# Patient Record
Sex: FEMALE | Race: BLACK OR AFRICAN AMERICAN | NOT HISPANIC OR LATINO | URBAN - METROPOLITAN AREA
[De-identification: names, ages, dates, MRNs, and addresses within clinical notes are randomized per-mention and may not be internally consistent; named-entity substitution may affect disease eponyms.]

---
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
## 2018-11-05 ENCOUNTER — EMERGENCY (EMERGENCY)
Facility: HOSPITAL | Age: 18
LOS: 1 days | End: 2018-11-05
Payer: SELF-PAY

## 2018-11-05 PROCEDURE — 99284 EMERGENCY DEPT VISIT MOD MDM: CPT

## 2018-11-05 PROCEDURE — 72125 CT NECK SPINE W/O DYE: CPT | Mod: 26

## 2018-11-05 PROCEDURE — 70450 CT HEAD/BRAIN W/O DYE: CPT | Mod: 26

## 2019-01-10 ENCOUNTER — EMERGENCY (EMERGENCY)
Facility: HOSPITAL | Age: 19
LOS: 1 days | End: 2019-01-10
Payer: OTHER MISCELLANEOUS

## 2019-01-10 PROCEDURE — 99284 EMERGENCY DEPT VISIT MOD MDM: CPT

## 2019-05-17 ENCOUNTER — APPOINTMENT (OUTPATIENT)
Dept: OBGYN | Facility: CLINIC | Age: 19
End: 2019-05-17

## 2020-06-23 ENCOUNTER — APPOINTMENT (OUTPATIENT)
Dept: OBGYN | Facility: CLINIC | Age: 20
End: 2020-06-23
Payer: COMMERCIAL

## 2020-06-23 VITALS
HEIGHT: 63 IN | BODY MASS INDEX: 51.03 KG/M2 | WEIGHT: 288 LBS | SYSTOLIC BLOOD PRESSURE: 120 MMHG | DIASTOLIC BLOOD PRESSURE: 78 MMHG

## 2020-06-23 DIAGNOSIS — Z78.9 OTHER SPECIFIED HEALTH STATUS: ICD-10-CM

## 2020-06-23 DIAGNOSIS — Z82.49 FAMILY HISTORY OF ISCHEMIC HEART DISEASE AND OTHER DISEASES OF THE CIRCULATORY SYSTEM: ICD-10-CM

## 2020-06-23 DIAGNOSIS — Z72.3 LACK OF PHYSICAL EXERCISE: ICD-10-CM

## 2020-06-23 PROCEDURE — 99203 OFFICE O/P NEW LOW 30 MIN: CPT

## 2020-06-23 PROCEDURE — 81025 URINE PREGNANCY TEST: CPT

## 2020-06-23 RX ORDER — LISINOPRIL AND HYDROCHLOROTHIAZIDE TABLETS 20; 12.5 MG/1; MG/1
20-12.5 TABLET ORAL
Refills: 0 | Status: ACTIVE | COMMUNITY

## 2020-06-24 RX ORDER — NORETHINDRONE ACETATE AND ETHINYL ESTRADIOL 1.5-30(21)
1.5-3 KIT ORAL
Qty: 28 | Refills: 0 | Status: DISCONTINUED | COMMUNITY
Start: 2019-05-06

## 2020-06-24 RX ORDER — METRONIDAZOLE 500 MG/1
500 TABLET ORAL
Qty: 4 | Refills: 0 | Status: DISCONTINUED | COMMUNITY
Start: 2020-06-01

## 2020-06-24 RX ORDER — LISINOPRIL AND HYDROCHLOROTHIAZIDE TABLETS 10; 12.5 MG/1; MG/1
10-12.5 TABLET ORAL
Qty: 30 | Refills: 0 | Status: ACTIVE | COMMUNITY
Start: 2020-06-22

## 2020-06-24 NOTE — COUNSELING
[Exercise] : exercise [Nutrition] : nutrition [STD (testing, results, tx)] : STD (testing, results, tx) [Menstrual Calendar] : menstrual calendar [Contraception] : contraception

## 2020-06-24 NOTE — PHYSICAL EXAM
[Awake] : awake [Alert] : alert [Oriented x3] : oriented to person, place, and time [Normal Rate] : the respiratory rate was normal [Normal Rhythm/Effort] : normal respiratory rhythm and effort [Acute Distress] : no acute distress [Flat Affect] : affect not flat [Depressed Mood] : not depressed

## 2020-06-24 NOTE — HISTORY OF PRESENT ILLNESS
[1 Year Ago] : 1 year ago [Good] : being in good health [Reproductive Age] : is of reproductive age [Up to Date] : up to date with ~his/her~ STD screening [Last Screen ___] : last STD screen was [unfilled] [Definite:  ___ (Date)] : the last menstrual period was [unfilled]

## 2020-06-24 NOTE — CHIEF COMPLAINT
[Initial Visit] : initial GYN visit [FreeTextEntry1] : Pt reports she was being seen with [facility], reports hx of PCOS, diagnosed in 2017. Pt reports AUB- prolonged uterine bleeding. \par Pt denies abd pain, cramping, vaginal discharge

## 2020-06-30 ENCOUNTER — ASOB RESULT (OUTPATIENT)
Age: 20
End: 2020-06-30

## 2020-06-30 ENCOUNTER — APPOINTMENT (OUTPATIENT)
Dept: OBGYN | Facility: CLINIC | Age: 20
End: 2020-06-30
Payer: COMMERCIAL

## 2020-06-30 VITALS
TEMPERATURE: 98.4 F | DIASTOLIC BLOOD PRESSURE: 78 MMHG | SYSTOLIC BLOOD PRESSURE: 124 MMHG | BODY MASS INDEX: 51.03 KG/M2 | WEIGHT: 288 LBS | HEIGHT: 63 IN

## 2020-06-30 LAB
HCG UR QL: NEGATIVE
QUALITY CONTROL: YES

## 2020-06-30 PROCEDURE — 99213 OFFICE O/P EST LOW 20 MIN: CPT | Mod: 25

## 2020-06-30 PROCEDURE — 96372 THER/PROPH/DIAG INJ SC/IM: CPT

## 2020-06-30 PROCEDURE — 76856 US EXAM PELVIC COMPLETE: CPT | Mod: 59

## 2020-06-30 RX ORDER — MEDROXYPROGESTERONE ACETATE 150 MG/ML
150 INJECTION, SUSPENSION INTRAMUSCULAR
Refills: 0 | Status: COMPLETED | OUTPATIENT
Start: 2020-06-30

## 2020-06-30 NOTE — COUNSELING
[Menstrual Calendar] : menstrual calendar [Safe Sexual Practices] : safe sexual practices [Contraception] : contraception

## 2020-06-30 NOTE — HISTORY OF PRESENT ILLNESS
[Heavy Bleeding] : described as heavy in severity [Prolonged Menses] : prolonged menses [Bleeding] : bleeding

## 2020-06-30 NOTE — CHIEF COMPLAINT
[Follow Up] : follow up GYN visit [FreeTextEntry1] : Pt here for Depo start, had sono today d/t hx of PCOS. Multiple follicles, L ovary confirms PCOS.\par Pt unable to take CHC d/t HTN.\par Pt reports ongoing VB, clots but denies dizziness, lightheadedness.\par Weight today is 283.\par Pt advised of Mirena and depo for control of VB.

## 2020-06-30 NOTE — PHYSICAL EXAM
[Alert] : alert [Awake] : awake [Oriented x3] : oriented to person, place, and time [Depressed Mood] : not depressed [Acute Distress] : no acute distress [Normal Rate] : the respiratory rate was normal [Flat Affect] : affect not flat [Normal Rhythm/Effort] : normal respiratory rhythm and effort

## 2020-07-07 RX ORDER — MEDROXYPROGESTERONE ACETATE 150 MG/ML
150 INJECTION, SUSPENSION INTRAMUSCULAR
Refills: 0 | Status: COMPLETED | OUTPATIENT
Start: 2020-07-07

## 2020-07-08 LAB
C TRACH RRNA SPEC QL NAA+PROBE: NOT DETECTED
N GONORRHOEA RRNA SPEC QL NAA+PROBE: NOT DETECTED
SOURCE AMPLIFICATION: NORMAL

## 2020-08-12 ENCOUNTER — APPOINTMENT (OUTPATIENT)
Dept: OBGYN | Facility: CLINIC | Age: 20
End: 2020-08-12
Payer: COMMERCIAL

## 2020-08-12 ENCOUNTER — APPOINTMENT (OUTPATIENT)
Dept: OBGYN | Facility: CLINIC | Age: 20
End: 2020-08-12

## 2020-08-12 VITALS — DIASTOLIC BLOOD PRESSURE: 70 MMHG | WEIGHT: 285 LBS | SYSTOLIC BLOOD PRESSURE: 120 MMHG

## 2020-08-12 PROCEDURE — 99212 OFFICE O/P EST SF 10 MIN: CPT

## 2020-09-10 ENCOUNTER — APPOINTMENT (OUTPATIENT)
Dept: OBGYN | Facility: CLINIC | Age: 20
End: 2020-09-10

## 2020-09-18 ENCOUNTER — APPOINTMENT (OUTPATIENT)
Dept: OBGYN | Facility: CLINIC | Age: 20
End: 2020-09-18
Payer: COMMERCIAL

## 2020-09-18 VITALS
WEIGHT: 285 LBS | HEIGHT: 63 IN | DIASTOLIC BLOOD PRESSURE: 80 MMHG | SYSTOLIC BLOOD PRESSURE: 122 MMHG | BODY MASS INDEX: 50.5 KG/M2

## 2020-09-18 LAB
HCG UR QL: NEGATIVE
QUALITY CONTROL: YES

## 2020-09-18 PROCEDURE — 96372 THER/PROPH/DIAG INJ SC/IM: CPT

## 2020-09-18 RX ORDER — MEDROXYPROGESTERONE ACETATE 150 MG/ML
150 INJECTION, SUSPENSION INTRAMUSCULAR
Refills: 0 | Status: COMPLETED | OUTPATIENT
Start: 2020-09-18

## 2020-09-18 NOTE — DISCUSSION/SUMMARY
[FreeTextEntry1] : see RN charting for DEpo inj information no back pain, no gout, no musculoskeletal pain, no neck pain, and no weakness.

## 2020-12-04 ENCOUNTER — APPOINTMENT (OUTPATIENT)
Dept: OBGYN | Facility: CLINIC | Age: 20
End: 2020-12-04

## 2020-12-17 ENCOUNTER — APPOINTMENT (OUTPATIENT)
Dept: OBGYN | Facility: CLINIC | Age: 20
End: 2020-12-17

## 2021-01-13 ENCOUNTER — APPOINTMENT (OUTPATIENT)
Dept: OBGYN | Facility: CLINIC | Age: 21
End: 2021-01-13
Payer: COMMERCIAL

## 2021-01-13 PROCEDURE — 96372 THER/PROPH/DIAG INJ SC/IM: CPT

## 2021-01-13 PROCEDURE — 99072 ADDL SUPL MATRL&STAF TM PHE: CPT

## 2021-01-13 RX ORDER — MEDROXYPROGESTERONE ACETATE 150 MG/ML
150 INJECTION, SUSPENSION INTRAMUSCULAR
Refills: 0 | Status: COMPLETED | OUTPATIENT
Start: 2021-01-13

## 2021-01-13 RX ADMIN — MEDROXYPROGESTERONE ACETATE 0 MG/ML: 150 INJECTION, SUSPENSION INTRAMUSCULAR at 00:00

## 2021-01-14 LAB
HCG UR QL: NEGATIVE
QUALITY CONTROL: YES

## 2021-02-08 ENCOUNTER — APPOINTMENT (OUTPATIENT)
Dept: OBGYN | Facility: CLINIC | Age: 21
End: 2021-02-08

## 2021-02-10 ENCOUNTER — APPOINTMENT (OUTPATIENT)
Dept: OBGYN | Facility: CLINIC | Age: 21
End: 2021-02-10
Payer: COMMERCIAL

## 2021-02-10 VITALS — BODY MASS INDEX: 51.91 KG/M2 | HEIGHT: 63 IN | WEIGHT: 293 LBS

## 2021-02-10 DIAGNOSIS — B37.3 CANDIDIASIS OF VULVA AND VAGINA: ICD-10-CM

## 2021-02-10 PROCEDURE — 99072 ADDL SUPL MATRL&STAF TM PHE: CPT

## 2021-02-10 PROCEDURE — 99213 OFFICE O/P EST LOW 20 MIN: CPT

## 2021-02-10 NOTE — PHYSICAL EXAM
[No Lesions] : no genitalia lesions [Labia Majora Erythema] : erythema of the labia majora [Labia Majora] : labia major [Labia Minora] : labia minora [Normal] : clitoris [Discharge] : a  ~M vaginal discharge was present [Scant] : scant [White] : white [Thick] : thick [No Bleeding] : there was no active vaginal bleeding [Foul Smelling] : not foul smelling [Motion Tenderness] : there was no cervical motion tenderness

## 2021-02-10 NOTE — CHIEF COMPLAINT
[Urgent Visit] : Urgent Visit [FreeTextEntry1] : Pt reports vaginal itching x 5 days. Pt denies new sexual partners. Pt was on antibiotics 12/31/2020 for dental work.

## 2021-02-11 LAB
CANDIDA VAG CYTO: DETECTED
G VAGINALIS+PREV SP MTYP VAG QL MICRO: NOT DETECTED
T VAGINALIS VAG QL WET PREP: NOT DETECTED

## 2021-02-11 RX ORDER — ALBUTEROL SULFATE 90 UG/1
108 (90 BASE) INHALANT RESPIRATORY (INHALATION)
Qty: 18 | Refills: 0 | Status: ACTIVE | COMMUNITY
Start: 2021-02-01

## 2021-02-11 RX ORDER — AMLODIPINE BESYLATE 5 MG/1
5 TABLET ORAL
Qty: 30 | Refills: 0 | Status: ACTIVE | COMMUNITY
Start: 2021-01-28

## 2021-02-24 ENCOUNTER — APPOINTMENT (OUTPATIENT)
Dept: OBGYN | Facility: CLINIC | Age: 21
End: 2021-02-24

## 2021-03-10 ENCOUNTER — APPOINTMENT (OUTPATIENT)
Dept: OBGYN | Facility: CLINIC | Age: 21
End: 2021-03-10

## 2021-04-02 ENCOUNTER — APPOINTMENT (OUTPATIENT)
Dept: OBGYN | Facility: CLINIC | Age: 21
End: 2021-04-02

## 2021-04-07 ENCOUNTER — APPOINTMENT (OUTPATIENT)
Dept: OBGYN | Facility: CLINIC | Age: 21
End: 2021-04-07

## 2021-04-09 ENCOUNTER — NON-APPOINTMENT (OUTPATIENT)
Age: 21
End: 2021-04-09

## 2021-04-09 ENCOUNTER — APPOINTMENT (OUTPATIENT)
Dept: OBGYN | Facility: CLINIC | Age: 21
End: 2021-04-09
Payer: COMMERCIAL

## 2021-04-09 VITALS
DIASTOLIC BLOOD PRESSURE: 92 MMHG | BODY MASS INDEX: 51.91 KG/M2 | WEIGHT: 293 LBS | SYSTOLIC BLOOD PRESSURE: 142 MMHG | HEIGHT: 63 IN

## 2021-04-09 PROCEDURE — 96372 THER/PROPH/DIAG INJ SC/IM: CPT

## 2021-04-09 PROCEDURE — 99072 ADDL SUPL MATRL&STAF TM PHE: CPT

## 2021-04-09 RX ORDER — MEDROXYPROGESTERONE ACETATE 150 MG/ML
150 INJECTION, SUSPENSION INTRAMUSCULAR
Refills: 0 | Status: COMPLETED | OUTPATIENT
Start: 2021-04-09

## 2021-04-09 RX ADMIN — Medication 0 MG/ML: at 00:00

## 2021-04-12 ENCOUNTER — NON-APPOINTMENT (OUTPATIENT)
Age: 21
End: 2021-04-12

## 2021-04-21 ENCOUNTER — APPOINTMENT (OUTPATIENT)
Dept: OBGYN | Facility: CLINIC | Age: 21
End: 2021-04-21
Payer: COMMERCIAL

## 2021-04-21 VITALS
WEIGHT: 293 LBS | DIASTOLIC BLOOD PRESSURE: 80 MMHG | HEIGHT: 63 IN | BODY MASS INDEX: 51.91 KG/M2 | SYSTOLIC BLOOD PRESSURE: 130 MMHG

## 2021-04-21 PROCEDURE — 99213 OFFICE O/P EST LOW 20 MIN: CPT

## 2021-04-21 PROCEDURE — 99072 ADDL SUPL MATRL&STAF TM PHE: CPT

## 2021-04-21 RX ORDER — IBUPROFEN 400 MG/1
400 TABLET, FILM COATED ORAL
Qty: 36 | Refills: 0 | Status: DISCONTINUED | COMMUNITY
Start: 2021-02-01 | End: 2021-04-21

## 2021-04-21 RX ORDER — AMOXICILLIN 875 MG/1
875 TABLET, FILM COATED ORAL
Qty: 20 | Refills: 0 | Status: DISCONTINUED | COMMUNITY
Start: 2021-02-01 | End: 2021-04-21

## 2021-04-21 RX ORDER — NYSTATIN 100000 U/G
100000 OINTMENT TOPICAL 3 TIMES DAILY
Qty: 1 | Refills: 2 | Status: DISCONTINUED | COMMUNITY
Start: 2021-02-10 | End: 2021-04-21

## 2021-04-21 RX ORDER — IBUPROFEN 800 MG/1
800 TABLET, FILM COATED ORAL
Qty: 24 | Refills: 0 | Status: DISCONTINUED | COMMUNITY
Start: 2021-01-05 | End: 2021-04-21

## 2021-04-21 RX ORDER — FLUTICASONE PROPIONATE 50 UG/1
50 SPRAY, METERED NASAL
Qty: 16 | Refills: 0 | Status: DISCONTINUED | COMMUNITY
Start: 2021-01-04 | End: 2021-04-21

## 2021-04-21 RX ORDER — AZITHROMYCIN 250 MG/1
250 TABLET, FILM COATED ORAL
Qty: 6 | Refills: 0 | Status: DISCONTINUED | COMMUNITY
Start: 2021-01-04 | End: 2021-04-21

## 2021-04-21 RX ORDER — FLUCONAZOLE 150 MG/1
150 TABLET ORAL
Qty: 2 | Refills: 1 | Status: DISCONTINUED | COMMUNITY
Start: 2021-02-10 | End: 2021-04-21

## 2021-04-21 NOTE — DISCUSSION/SUMMARY
[FreeTextEntry1] : IMB for 47 days likely d/t poor timing of her depo injection.\par Unable to do bimanual exam on patient-ordering sono to r/o cyst or issues other than BTB causing IMB\par F/U with MD post sono\par Pt needs annual-will return for that once VB stops

## 2021-04-21 NOTE — PHYSICAL EXAM
[Labia Majora] : normal [Normal] : normal [FreeTextEntry4] : lots of dark brown blood in vault [FreeTextEntry6] : ovaries and uterus not palpable d/t habitus

## 2021-04-21 NOTE — HISTORY OF PRESENT ILLNESS
[FreeTextEntry1] : 21 y/o in same sex relationship on depo for PCOS now with IMB. Was three days late for her injection however on examination of record I see she was late for injection prior to this last one as well. She has been bleeding for 47 days mostly just spotting\par She has also noted R sided pain that she believes is related to her ovary

## 2021-04-26 ENCOUNTER — APPOINTMENT (OUTPATIENT)
Dept: OBGYN | Facility: CLINIC | Age: 21
End: 2021-04-26
Payer: COMMERCIAL

## 2021-04-26 ENCOUNTER — ASOB RESULT (OUTPATIENT)
Age: 21
End: 2021-04-26

## 2021-04-26 LAB
HCG UR QL: NEGATIVE
QUALITY CONTROL: YES

## 2021-04-26 PROCEDURE — 76830 TRANSVAGINAL US NON-OB: CPT | Mod: 59

## 2021-04-26 PROCEDURE — 76856 US EXAM PELVIC COMPLETE: CPT | Mod: 59

## 2021-04-26 PROCEDURE — 99072 ADDL SUPL MATRL&STAF TM PHE: CPT

## 2021-05-03 ENCOUNTER — APPOINTMENT (OUTPATIENT)
Dept: OBGYN | Facility: CLINIC | Age: 21
End: 2021-05-03
Payer: COMMERCIAL

## 2021-05-03 VITALS
SYSTOLIC BLOOD PRESSURE: 132 MMHG | BODY MASS INDEX: 47.09 KG/M2 | HEIGHT: 66 IN | WEIGHT: 293 LBS | DIASTOLIC BLOOD PRESSURE: 78 MMHG

## 2021-05-03 VITALS — TEMPERATURE: 98.1 F

## 2021-05-03 DIAGNOSIS — N92.3 OVULATION BLEEDING: ICD-10-CM

## 2021-05-03 PROCEDURE — 99212 OFFICE O/P EST SF 10 MIN: CPT

## 2021-05-03 PROCEDURE — 99072 ADDL SUPL MATRL&STAF TM PHE: CPT

## 2021-05-03 NOTE — HISTORY OF PRESENT ILLNESS
[FreeTextEntry1] : 20 yr old female presenting for AUB follow up. was seen by LAISHA De La Paz 4.21.2021 for AUB. Patient is on DMPA for heavy and painful menses. SHe was sent for US today - NL sonogram. I spke with patient about her sonogram and the options available to her. Patient has PCOS, DMtype 2, HTN and obesity. I disucssed that all of this is connected and that a healthier lifestyle will help all of this. Patient reports that her bleeding issues have been since 14 yrs old, she has been the same weight since then. Patient reports since being dx with DM she is eating healthier but is not exercising. I advised the patient to exercise. Patient reports when she was on OCPs her menses were regular and light. Patient understands that givne her medical problems, it is contraindicated to be on estrogen containing contraception. Patient is very frustrated with the bleeding. Mirena has not worked in the past. She is only on 5 months of DPMA - I advised her that the bleeding should imporved after 6 months. Patient terminated the appointment prematurely.

## 2021-07-12 ENCOUNTER — APPOINTMENT (OUTPATIENT)
Dept: OBGYN | Facility: CLINIC | Age: 21
End: 2021-07-12
Payer: COMMERCIAL

## 2021-07-12 VITALS
HEIGHT: 66 IN | BODY MASS INDEX: 47.09 KG/M2 | WEIGHT: 293 LBS | SYSTOLIC BLOOD PRESSURE: 140 MMHG | DIASTOLIC BLOOD PRESSURE: 90 MMHG

## 2021-07-12 DIAGNOSIS — Z30.42 ENCOUNTER FOR SURVEILLANCE OF INJECTABLE CONTRACEPTIVE: ICD-10-CM

## 2021-07-12 PROCEDURE — 96372 THER/PROPH/DIAG INJ SC/IM: CPT

## 2021-07-12 NOTE — DISCUSSION/SUMMARY
[FreeTextEntry1] : Lot#[id number] Exp 3/31/25\par [id number] given in L arm\par Discussed LARC methods\par Pt decided she wants to try NExplanon\par Will get approval and place\par Pt needs annual exam-will have that scheduled as well

## 2021-07-12 NOTE — HISTORY OF PRESENT ILLNESS
[FreeTextEntry1] : Pt here for Depo injection but also to discuss changing her method\par She forgets to come in every three months and also thinks it is a hassle\par Occasionally will get small amount of spotting\par Review of record no pap has been done to date\par Pt has hx of PCOS and is also hypertensive on Lisinopril

## 2021-09-20 ENCOUNTER — APPOINTMENT (OUTPATIENT)
Dept: OBGYN | Facility: CLINIC | Age: 21
End: 2021-09-20

## 2021-10-11 ENCOUNTER — APPOINTMENT (OUTPATIENT)
Dept: OBGYN | Facility: CLINIC | Age: 21
End: 2021-10-11

## 2021-10-18 ENCOUNTER — APPOINTMENT (OUTPATIENT)
Dept: OBGYN | Facility: CLINIC | Age: 21
End: 2021-10-18
Payer: COMMERCIAL

## 2021-10-18 VITALS
DIASTOLIC BLOOD PRESSURE: 62 MMHG | WEIGHT: 293 LBS | HEIGHT: 66 IN | BODY MASS INDEX: 47.09 KG/M2 | SYSTOLIC BLOOD PRESSURE: 130 MMHG

## 2021-10-18 DIAGNOSIS — R10.2 PELVIC AND PERINEAL PAIN: ICD-10-CM

## 2021-10-18 PROCEDURE — 81002 URINALYSIS NONAUTO W/O SCOPE: CPT

## 2021-10-18 PROCEDURE — 96372 THER/PROPH/DIAG INJ SC/IM: CPT

## 2021-10-18 RX ORDER — MEDROXYPROGESTERONE ACETATE 150 MG/ML
150 INJECTION, SUSPENSION INTRAMUSCULAR
Qty: 0 | Refills: 0 | Status: COMPLETED | OUTPATIENT
Start: 2021-10-18

## 2021-10-18 RX ADMIN — MEDROXYPROGESTERONE ACETATE 0 MG/ML: 150 INJECTION, SUSPENSION INTRAMUSCULAR at 00:00

## 2021-10-18 NOTE — PLAN
[FreeTextEntry1] : Depo given today\par Renewed Depo x 12 months.\par Discussed Vit D, Calcium daily or multivitamin\par RTO in 3 months for annual and depo.\par Pt in same sex relationship, using Depo for menstrual bleeding control.\par F/u with PCP as discussed for HTN/ health maintenance.\par \par Beverly Smith CM

## 2022-01-03 ENCOUNTER — APPOINTMENT (OUTPATIENT)
Dept: OBGYN | Facility: CLINIC | Age: 22
End: 2022-01-03
Payer: COMMERCIAL

## 2022-01-03 VITALS
WEIGHT: 288 LBS | BODY MASS INDEX: 45.2 KG/M2 | SYSTOLIC BLOOD PRESSURE: 132 MMHG | HEIGHT: 67 IN | DIASTOLIC BLOOD PRESSURE: 78 MMHG

## 2022-01-03 DIAGNOSIS — Z01.419 ENCOUNTER FOR GYNECOLOGICAL EXAMINATION (GENERAL) (ROUTINE) W/OUT ABNORMAL FINDINGS: ICD-10-CM

## 2022-01-03 LAB
HCG UR QL: NEGATIVE
QUALITY CONTROL: YES

## 2022-01-03 PROCEDURE — 96372 THER/PROPH/DIAG INJ SC/IM: CPT

## 2022-01-03 PROCEDURE — 99395 PREV VISIT EST AGE 18-39: CPT

## 2022-01-03 PROCEDURE — 99212 OFFICE O/P EST SF 10 MIN: CPT | Mod: 25

## 2022-01-03 RX ORDER — MEDROXYPROGESTERONE ACETATE 150 MG/ML
150 INJECTION, SUSPENSION INTRAMUSCULAR
Qty: 0 | Refills: 0 | Status: COMPLETED | OUTPATIENT
Start: 2022-01-03

## 2022-01-03 RX ORDER — OXYCODONE AND ACETAMINOPHEN 5; 325 MG/1; MG/1
5-325 TABLET ORAL
Qty: 10 | Refills: 0 | Status: DISCONTINUED | COMMUNITY
Start: 2021-01-05 | End: 2022-01-03

## 2022-01-03 RX ADMIN — MEDROXYPROGESTERONE ACETATE 0 MG/ML: 150 INJECTION, SUSPENSION INTRAMUSCULAR at 00:00

## 2022-01-03 NOTE — PHYSICAL EXAM
[Appropriately responsive] : appropriately responsive [Alert] : alert [No Acute Distress] : no acute distress [No Lymphadenopathy] : no lymphadenopathy [Regular Rate Rhythm] : regular rate rhythm [No Murmurs] : no murmurs [Clear to Auscultation B/L] : clear to auscultation bilaterally [Soft] : soft [Non-tender] : non-tender [Non-distended] : non-distended [No HSM] : No HSM [Oriented x3] : oriented x3 [FreeTextEntry7] : obese [Examination Of The Breasts] : a normal appearance [No Masses] : no breast masses were palpable [Labia Majora] : normal [Labia Minora] : normal [No Bleeding] : There was no active vaginal bleeding [Normal] : normal [Tenderness] : nontender [Uterine Adnexae] : normal [FreeTextEntry6] : difficult to assess d/t habitus

## 2022-01-03 NOTE — PLAN
[FreeTextEntry1] : Pap, GC/CT today\par PCOS labs today\par Pt to establish care with PCP closer to home for diabetes and HTN.\par Weight loss discussed. Exercise.\par Depo given\par  ndc [id number]\par \par RTO in 3 months or sooner prn\par \par Beverly Smith CM

## 2022-01-03 NOTE — HISTORY OF PRESENT ILLNESS
[N] : Patient denies prior pregnancies [FreeTextEntry1] : Pt reports bleeding from 5/21 to 11/21, spotting to med with cramps.\par \par Last TVUS 4/21 was normal.\par \par Pt reports HTN, Diabetes needs f/u with PCP, numbers given to establish care.\par \par Will draw PCOS labs today, Hgb A1C.\par \par Discussed Mirena, Kyleena. Pt has tried these already but reported bleeding x 1 year.\par Discussed bleeding pattern on Nexplanon. Pt to consider but advised bleeding pattern similar to Depo. [PapSmeardate] : never [TextBox_102] : irreg, pt reports bleeding 5/21 to 11/21, spotting and heavy at times [LMPDate] : 11/2021

## 2022-01-04 ENCOUNTER — NON-APPOINTMENT (OUTPATIENT)
Age: 22
End: 2022-01-04

## 2022-01-05 LAB
25(OH)D3 SERPL-MCNC: 15.4 NG/ML
BASOPHILS # BLD AUTO: 0.04 K/UL
BASOPHILS NFR BLD AUTO: 0.3 %
C TRACH RRNA SPEC QL NAA+PROBE: NOT DETECTED
EOSINOPHIL # BLD AUTO: 0.17 K/UL
EOSINOPHIL NFR BLD AUTO: 1.5 %
ESTIMATED AVERAGE GLUCOSE: 154 MG/DL
FSH SERPL-MCNC: 1.8 IU/L
HBA1C MFR BLD HPLC: 7 %
HCT VFR BLD CALC: 36.7 %
HGB BLD-MCNC: 12 G/DL
IMM GRANULOCYTES NFR BLD AUTO: 0.3 %
LH SERPL-ACNC: 1.1 IU/L
LYMPHOCYTES # BLD AUTO: 2.99 K/UL
LYMPHOCYTES NFR BLD AUTO: 25.8 %
MAN DIFF?: NORMAL
MCHC RBC-ENTMCNC: 24.7 PG
MCHC RBC-ENTMCNC: 32.7 GM/DL
MCV RBC AUTO: 75.7 FL
MONOCYTES # BLD AUTO: 0.54 K/UL
MONOCYTES NFR BLD AUTO: 4.7 %
N GONORRHOEA RRNA SPEC QL NAA+PROBE: NOT DETECTED
NEUTROPHILS # BLD AUTO: 7.82 K/UL
NEUTROPHILS NFR BLD AUTO: 67.4 %
PLATELET # BLD AUTO: 347 K/UL
RBC # BLD: 4.85 M/UL
RBC # FLD: 15.7 %
SOURCE TP AMPLIFICATION: NORMAL
TSH SERPL-ACNC: 1.67 UIU/ML
WBC # FLD AUTO: 11.59 K/UL

## 2022-01-07 LAB — CYTOLOGY CVX/VAG DOC THIN PREP: NORMAL

## 2022-01-24 LAB — 17OHP SERPL-MCNC: <10 NG/DL

## 2022-03-07 ENCOUNTER — APPOINTMENT (OUTPATIENT)
Dept: OBGYN | Facility: CLINIC | Age: 22
End: 2022-03-07

## 2022-03-24 ENCOUNTER — APPOINTMENT (OUTPATIENT)
Dept: OBGYN | Facility: CLINIC | Age: 22
End: 2022-03-24
Payer: COMMERCIAL

## 2022-03-24 VITALS
DIASTOLIC BLOOD PRESSURE: 78 MMHG | WEIGHT: 285 LBS | BODY MASS INDEX: 44.73 KG/M2 | HEIGHT: 67 IN | SYSTOLIC BLOOD PRESSURE: 126 MMHG

## 2022-03-24 LAB
HCG UR QL: NEGATIVE
QUALITY CONTROL: YES

## 2022-03-24 PROCEDURE — 96372 THER/PROPH/DIAG INJ SC/IM: CPT

## 2022-03-24 NOTE — HISTORY OF PRESENT ILLNESS
[FreeTextEntry1] : pt here for depo injection at appropriate time -see nurse note for details\par Pt desires Nexplanon -brouchure given \par Will make appt for placement in 3 months

## 2022-06-06 ENCOUNTER — APPOINTMENT (OUTPATIENT)
Dept: OBGYN | Facility: CLINIC | Age: 22
End: 2022-06-06

## 2022-06-20 ENCOUNTER — APPOINTMENT (OUTPATIENT)
Dept: OBGYN | Facility: CLINIC | Age: 22
End: 2022-06-20

## 2022-07-01 ENCOUNTER — APPOINTMENT (OUTPATIENT)
Dept: OBGYN | Facility: CLINIC | Age: 22
End: 2022-07-01

## 2022-07-01 VITALS
HEIGHT: 67 IN | WEIGHT: 293 LBS | BODY MASS INDEX: 45.99 KG/M2 | DIASTOLIC BLOOD PRESSURE: 84 MMHG | SYSTOLIC BLOOD PRESSURE: 124 MMHG

## 2022-07-01 LAB
HCG UR QL: NEGATIVE
QUALITY CONTROL: YES

## 2022-07-01 PROCEDURE — 99212 OFFICE O/P EST SF 10 MIN: CPT

## 2022-07-01 NOTE — HISTORY OF PRESENT ILLNESS
[FreeTextEntry1] : 21 yo in same sex relationship has been on Depo injection for cycle control has since DC'd due to excessive BTB. She reports she has been on hormones of one kind or another for at least three years with the goal to get her period. SHe forgets pills but desires JUNE. SHe declines IUD since she had one with eight months of BTB\par She has obesity, hypertension and diabetes\par Does not go to her primary MDmuch but says that the doctor told her she was OK on COCs\par Takes Amolodipine 5 mg qd but forgets it often and rarely has issues with her blood pressure. Does not take her bp but feels she is aware when it is high because she gets a headache

## 2022-07-01 NOTE — DISCUSSION/SUMMARY
[FreeTextEntry1] : Discussed that hypertension and diabetes are usually silent problems and going on the fact that she feels well is not a safe method\par WE discussed that a lot of time has gone by with her taking hormones just for cycle control \par She is never sexually active with men\par Plan we made is that she is stopping all hormones and if she gets no period for three months she will come to office and we will force her menses with 10 days of Provera.\par Pt verbalized understanding that the concern is that COCs will cause clots with her medical problems

## 2022-07-19 ENCOUNTER — EMERGENCY (EMERGENCY)
Facility: HOSPITAL | Age: 22
LOS: 1 days | Discharge: HOME | End: 2022-07-19
Admitting: EMERGENCY MEDICINE

## 2022-07-19 DIAGNOSIS — R51.9 HEADACHE, UNSPECIFIED: ICD-10-CM

## 2022-07-19 DIAGNOSIS — I10 ESSENTIAL (PRIMARY) HYPERTENSION: ICD-10-CM

## 2022-07-19 DIAGNOSIS — E11.9 TYPE 2 DIABETES MELLITUS WITHOUT COMPLICATIONS: ICD-10-CM

## 2022-07-19 DIAGNOSIS — H92.02 OTALGIA, LEFT EAR: ICD-10-CM

## 2022-07-19 PROCEDURE — L9991: CPT

## 2023-03-08 ENCOUNTER — APPOINTMENT (OUTPATIENT)
Dept: OBGYN | Facility: CLINIC | Age: 23
End: 2023-03-08
Payer: COMMERCIAL

## 2023-03-08 VITALS
HEIGHT: 67 IN | BODY MASS INDEX: 45.99 KG/M2 | SYSTOLIC BLOOD PRESSURE: 142 MMHG | DIASTOLIC BLOOD PRESSURE: 85 MMHG | WEIGHT: 293 LBS

## 2023-03-08 DIAGNOSIS — E28.2 POLYCYSTIC OVARIAN SYNDROME: ICD-10-CM

## 2023-03-08 DIAGNOSIS — Z32.02 ENCOUNTER FOR PREGNANCY TEST, RESULT NEGATIVE: ICD-10-CM

## 2023-03-08 DIAGNOSIS — E53.8 DEFICIENCY OF OTHER SPECIFIED B GROUP VITAMINS: ICD-10-CM

## 2023-03-08 DIAGNOSIS — Z30.42 ENCOUNTER FOR SURVEILLANCE OF INJECTABLE CONTRACEPTIVE: ICD-10-CM

## 2023-03-08 DIAGNOSIS — E55.9 VITAMIN D DEFICIENCY, UNSPECIFIED: ICD-10-CM

## 2023-03-08 DIAGNOSIS — N92.1 EXCESSIVE AND FREQUENT MENSTRUATION WITH IRREGULAR CYCLE: ICD-10-CM

## 2023-03-08 DIAGNOSIS — Z30.09 ENCOUNTER FOR OTHER GENERAL COUNSELING AND ADVICE ON CONTRACEPTION: ICD-10-CM

## 2023-03-08 PROCEDURE — 99213 OFFICE O/P EST LOW 20 MIN: CPT

## 2023-03-08 RX ORDER — DIVALPROEX SODIUM 500 MG/1
500 TABLET, DELAYED RELEASE ORAL
Refills: 0 | Status: ACTIVE | COMMUNITY

## 2023-03-08 RX ORDER — MEDROXYPROGESTERONE ACETATE 150 MG/ML
150 INJECTION, SUSPENSION INTRAMUSCULAR
Qty: 1 | Refills: 3 | Status: DISCONTINUED | COMMUNITY
Start: 2020-06-23 | End: 2023-03-08

## 2023-03-08 RX ORDER — GLYBURIDE 2.5 MG/1
2.5 TABLET ORAL
Refills: 0 | Status: DISCONTINUED | COMMUNITY
Start: 2021-10-18 | End: 2023-03-08

## 2023-03-08 RX ORDER — MELATONIN 3 MG
25 MCG TABLET ORAL
Qty: 30 | Refills: 10 | Status: ACTIVE | COMMUNITY
Start: 2023-03-08 | End: 1900-01-01

## 2023-03-08 NOTE — HISTORY OF PRESENT ILLNESS
[Light Bleeding] : described as light in severity [Bleeding] : bleeding [___ Months] : began [unfilled] months ago [Definite:  ___ (Date)] : the last menstrual period was [unfilled] [Sexually Active] : is sexually active [Monogamous] : is monogamous [Female ___] : [unfilled] female [Dizziness] : no dizziness [FreeTextEntry8] : LMP 2/1/23, constant, lighter some days

## 2023-03-08 NOTE — CHIEF COMPLAINT
[Urgent Visit] : Urgent Visit [FreeTextEntry1] : Pt reports vaginal bleeding, from bright red bleeding with some clots about quarter sized to brown discharge\par LMP 2/1/23

## 2023-03-08 NOTE — PHYSICAL EXAM
[No Lesions] : no genitalia lesions [Labia Majora] : labia major [Labia Minora] : labia minora [Normal] : clitoris [Moderate] : there was moderate vaginal bleeding [Normal Position] : in a normal position [Uterine Adnexae] : were not tender and not enlarged [Motion Tenderness] : there was no cervical motion tenderness [Tenderness] : nontender [FreeTextEntry7] : difficult to asses d/t habitus

## 2023-03-09 LAB
BASOPHILS # BLD AUTO: 0.04 K/UL
BASOPHILS NFR BLD AUTO: 0.3 %
EOSINOPHIL # BLD AUTO: 0.12 K/UL
EOSINOPHIL NFR BLD AUTO: 1 %
HCG UR QL: NEGATIVE
HCT VFR BLD CALC: 38 %
HGB BLD-MCNC: 12.8 G/DL
IMM GRANULOCYTES NFR BLD AUTO: 0.2 %
LYMPHOCYTES # BLD AUTO: 3.4 K/UL
LYMPHOCYTES NFR BLD AUTO: 28.1 %
MAN DIFF?: NORMAL
MCHC RBC-ENTMCNC: 26.6 PG
MCHC RBC-ENTMCNC: 33.7 GM/DL
MCV RBC AUTO: 78.8 FL
MONOCYTES # BLD AUTO: 0.7 K/UL
MONOCYTES NFR BLD AUTO: 5.8 %
NEUTROPHILS # BLD AUTO: 7.81 K/UL
NEUTROPHILS NFR BLD AUTO: 64.6 %
PLATELET # BLD AUTO: 370 K/UL
QUALITY CONTROL: YES
RBC # BLD: 4.82 M/UL
RBC # FLD: 15.5 %
TSH SERPL-ACNC: 1.96 UIU/ML
VIT B12 SERPL-MCNC: 453 PG/ML
WBC # FLD AUTO: 12.1 K/UL

## 2023-03-13 DIAGNOSIS — N76.0 ACUTE VAGINITIS: ICD-10-CM

## 2023-03-13 DIAGNOSIS — B96.89 ACUTE VAGINITIS: ICD-10-CM

## 2023-03-13 LAB
A VAGINAE DNA VAG QL NAA+PROBE: ABNORMAL
BVAB2 DNA VAG QL NAA+PROBE: ABNORMAL
C KRUSEI DNA VAG QL NAA+PROBE: NEGATIVE
C TRACH RRNA SPEC QL NAA+PROBE: NEGATIVE
MEGA1 DNA VAG QL NAA+PROBE: ABNORMAL
N GONORRHOEA RRNA SPEC QL NAA+PROBE: NEGATIVE
T VAGINALIS RRNA SPEC QL NAA+PROBE: NEGATIVE

## 2023-03-13 RX ORDER — OXYCODONE 5 MG/1
5 TABLET ORAL
Qty: 28 | Refills: 0 | Status: ACTIVE | COMMUNITY
Start: 2022-12-15

## 2023-03-13 RX ORDER — AMOXICILLIN 500 MG/1
500 CAPSULE ORAL
Qty: 30 | Refills: 0 | Status: ACTIVE | COMMUNITY
Start: 2022-11-16

## 2023-03-13 RX ORDER — DAPAGLIFLOZIN 5 MG/1
5 TABLET, FILM COATED ORAL
Qty: 30 | Refills: 0 | Status: ACTIVE | COMMUNITY
Start: 2022-12-14

## 2023-03-21 ENCOUNTER — APPOINTMENT (OUTPATIENT)
Dept: ANTEPARTUM | Facility: CLINIC | Age: 23
End: 2023-03-21
Payer: COMMERCIAL

## 2023-03-21 ENCOUNTER — APPOINTMENT (OUTPATIENT)
Dept: OBGYN | Facility: CLINIC | Age: 23
End: 2023-03-21
Payer: COMMERCIAL

## 2023-03-21 ENCOUNTER — ASOB RESULT (OUTPATIENT)
Age: 23
End: 2023-03-21

## 2023-03-21 VITALS
WEIGHT: 293 LBS | BODY MASS INDEX: 45.99 KG/M2 | HEIGHT: 67 IN | DIASTOLIC BLOOD PRESSURE: 80 MMHG | SYSTOLIC BLOOD PRESSURE: 149 MMHG

## 2023-03-21 DIAGNOSIS — Z71.2 PERSON CONSULTING FOR EXPLANATION OF EXAMINATION OR TEST FINDINGS: ICD-10-CM

## 2023-03-21 PROCEDURE — 76856 US EXAM PELVIC COMPLETE: CPT | Mod: 59

## 2023-03-21 PROCEDURE — 76830 TRANSVAGINAL US NON-OB: CPT

## 2023-03-21 PROCEDURE — 99212 OFFICE O/P EST SF 10 MIN: CPT

## 2023-03-21 NOTE — DISCUSSION/SUMMARY
[FreeTextEntry1] : Discussed results with pt\par Dr. Santos to do sonohysterogram\par All questions answered

## 2023-03-21 NOTE — HISTORY OF PRESENT ILLNESS
[FreeTextEntry1] : 23 y/o pt with ongoing complaints of abnormal bleeding-at times she has IMB and other times she misses periods\par She has been on Depo in the past but was bleeding through that so we DC'd it since she was not using it for birth control -only cycle control. She is in a same sex relationship\par She saw Desiree Smith with complaint of IMB for over a month and was given Provera x 10 days\par She reports she did have some brown discharge with it and no bleeding today\par SOno today with possible endometiral polyp\par EE was 8.6\par Sonographer reports that lining appeared web like-possiby cystic\par REcommendation per MFM was a sono hysterogram

## 2023-04-25 ENCOUNTER — APPOINTMENT (OUTPATIENT)
Dept: ANTEPARTUM | Facility: CLINIC | Age: 23
End: 2023-04-25
Payer: COMMERCIAL

## 2023-04-25 ENCOUNTER — LABORATORY RESULT (OUTPATIENT)
Age: 23
End: 2023-04-25

## 2023-04-25 ENCOUNTER — ASOB RESULT (OUTPATIENT)
Age: 23
End: 2023-04-25

## 2023-04-25 ENCOUNTER — APPOINTMENT (OUTPATIENT)
Dept: OBGYN | Facility: CLINIC | Age: 23
End: 2023-04-25
Payer: COMMERCIAL

## 2023-04-25 VITALS
BODY MASS INDEX: 45.36 KG/M2 | DIASTOLIC BLOOD PRESSURE: 87 MMHG | SYSTOLIC BLOOD PRESSURE: 133 MMHG | WEIGHT: 289 LBS | HEIGHT: 67 IN

## 2023-04-25 DIAGNOSIS — Z00.00 ENCOUNTER FOR GENERAL ADULT MEDICAL EXAMINATION W/OUT ABNORMAL FINDINGS: ICD-10-CM

## 2023-04-25 PROCEDURE — 99213 OFFICE O/P EST LOW 20 MIN: CPT

## 2023-04-25 PROCEDURE — 58340 CATHETER FOR HYSTEROGRAPHY: CPT

## 2023-04-25 PROCEDURE — 81025 URINE PREGNANCY TEST: CPT

## 2023-04-25 PROCEDURE — 76831 ECHO EXAM UTERUS: CPT

## 2023-04-26 LAB
ESTIMATED AVERAGE GLUCOSE: 137 MG/DL
ESTRADIOL SERPL-MCNC: 61 PG/ML
FSH SERPL-MCNC: 6.1 IU/L
HBA1C MFR BLD HPLC: 6.4 %
HCG SERPL-MCNC: <1 MIU/ML
HCG UR QL: NEGATIVE
LH SERPL-ACNC: 12 IU/L
PROGEST SERPL-MCNC: 0.2 NG/ML
PROLACTIN SERPL-MCNC: 24.5 NG/ML
QUALITY CONTROL: YES
TESTOST SERPL-MCNC: 44 NG/DL
TSH SERPL-ACNC: 2.1 UIU/ML

## 2023-05-01 LAB — 17OHP SERPL-MCNC: 42 NG/DL

## 2023-05-04 ENCOUNTER — APPOINTMENT (OUTPATIENT)
Dept: OBGYN | Facility: CLINIC | Age: 23
End: 2023-05-04
Payer: COMMERCIAL

## 2023-05-04 VITALS
HEIGHT: 67 IN | WEIGHT: 289 LBS | SYSTOLIC BLOOD PRESSURE: 128 MMHG | DIASTOLIC BLOOD PRESSURE: 82 MMHG | BODY MASS INDEX: 45.36 KG/M2

## 2023-05-04 DIAGNOSIS — N64.4 MASTODYNIA: ICD-10-CM

## 2023-05-04 LAB
ANDROST SERPL-MCNC: 116 NG/DL
DHEA-SULFATE, SERUM: 229 UG/DL

## 2023-05-04 PROCEDURE — 99212 OFFICE O/P EST SF 10 MIN: CPT

## 2023-05-04 NOTE — PHYSICAL EXAM
[Examination Of The Breasts] : a normal appearance [Normal] : normal [___cm] : a ~M [unfilled] ~Ucm superior lateral quadrant mass was palpated

## 2023-05-04 NOTE — DISCUSSION/SUMMARY
[FreeTextEntry1] : 23 y/o with breasts difficult to check -ordered breast sono bilaterally \par Do note milk from one nipple-tiny amount, not the other\par Will f/u with Dr. Robles in regards and consider repeat

## 2023-05-04 NOTE — HISTORY OF PRESENT ILLNESS
[FreeTextEntry1] : 21 y/o familiar to me, on no birth control, presents with complaint of breast pain bilateral since yesterday\par Went to [facility] for complaint. They examined her breasts, told her she did have masses and told her to f/u for her provider. Denies any new exercises, lifting anything heavy, new bras. Also noted some milk from both nipples. Prolactin was elevated 4/2023. Reports her period comes monthly, sometimes light, sometimes heavy.\par No breast ca in her family\par

## 2023-05-05 ENCOUNTER — NON-APPOINTMENT (OUTPATIENT)
Age: 23
End: 2023-05-05

## 2023-05-10 ENCOUNTER — APPOINTMENT (OUTPATIENT)
Dept: OBGYN | Facility: CLINIC | Age: 23
End: 2023-05-10
Payer: COMMERCIAL

## 2023-05-10 ENCOUNTER — NON-APPOINTMENT (OUTPATIENT)
Age: 23
End: 2023-05-10

## 2023-05-10 VITALS
HEIGHT: 67 IN | SYSTOLIC BLOOD PRESSURE: 135 MMHG | DIASTOLIC BLOOD PRESSURE: 85 MMHG | WEIGHT: 290 LBS | BODY MASS INDEX: 45.52 KG/M2

## 2023-05-10 PROCEDURE — 99212 OFFICE O/P EST SF 10 MIN: CPT

## 2023-05-10 NOTE — DISCUSSION/SUMMARY
[FreeTextEntry1] : 21yo morbidly obese female with HTN and AUB-? llikely with PCOS is sw/p uncomplicated SHG with thin EMS and no lesions. \par \par - Will need endo bx \par - Will d/c BCM to regular cycle \par - Weight loss encouraged \par - All questions solicited and answered \par \par Nancy Santos MD \par Obstetrician/Gynecologist\par

## 2023-05-10 NOTE — HISTORY OF PRESENT ILLNESS
[FreeTextEntry1] : 21yo LMP 4/27 morbidly obese F with HTN, prediabetes and irreg bleeding is here for follow up. She was seen last week for breast pain and reports spontaneous, unilateral nipple leakage. She had BIRADS 1 breast sono. Her PRL is also slightly elevated. \par \par She also requests STI testing \par

## 2023-05-10 NOTE — DISCUSSION/SUMMARY
[FreeTextEntry1] : 21yo hypertensive, prediabetic, obese F with elevated PRL and spontaneous, unilateral nipple leakage with normal sono. \par \par Nipple leakage: \par - WIll need Endo and breast MD \par \par Prediabetes/obesity:\par - Discussed that she would be likely candidate for weight loss/DM meds. \par \par Irreg Menses: \par - Offered Endo bx today but pt declined as she will be going in the water for her upcoming Bday \par - RTO for procedure \par - RBA reviewed. Aware she cannot have anything in vagina for 2 wks after \par - Discussed BCM(Mirena -- had mirena when she was teen). Pt in same sex relationship therefor would like to hold on BCM \par \par STI testing sent today \par \par Nancy Santos MD \par Obstetrician/Gynecologist\par

## 2023-05-10 NOTE — HISTORY OF PRESENT ILLNESS
[FreeTextEntry1] : 21yo morbidly obese female with HTN and AUB-? (intermenstrual bleeding and missed periods) is here for SHG given TVUS which could not r/o endo polyp.

## 2023-05-12 ENCOUNTER — NON-APPOINTMENT (OUTPATIENT)
Age: 23
End: 2023-05-12

## 2023-05-12 LAB
HBV SURFACE AB SER QL: NONREACTIVE
HBV SURFACE AG SER QL: NONREACTIVE
HCV AB SER QL: NONREACTIVE
HCV S/CO RATIO: 0.21 S/CO
HIV1+2 AB SPEC QL IA.RAPID: NONREACTIVE
T PALLIDUM AB SER QL IA: NEGATIVE

## 2023-06-04 ENCOUNTER — FORM ENCOUNTER (OUTPATIENT)
Age: 23
End: 2023-06-04

## 2023-06-27 ENCOUNTER — APPOINTMENT (OUTPATIENT)
Dept: OBGYN | Facility: CLINIC | Age: 23
End: 2023-06-27
Payer: COMMERCIAL

## 2023-06-27 VITALS
HEIGHT: 67 IN | DIASTOLIC BLOOD PRESSURE: 83 MMHG | BODY MASS INDEX: 45.71 KG/M2 | SYSTOLIC BLOOD PRESSURE: 128 MMHG | WEIGHT: 291.2 LBS

## 2023-06-27 LAB
HCG UR QL: NEGATIVE
QUALITY CONTROL: YES

## 2023-06-27 PROCEDURE — 81025 URINE PREGNANCY TEST: CPT

## 2023-06-27 PROCEDURE — 58100 BIOPSY OF UTERUS LINING: CPT

## 2023-06-27 RX ORDER — MEDROXYPROGESTERONE ACETATE 10 MG/1
10 TABLET ORAL
Qty: 10 | Refills: 0 | Status: DISCONTINUED | COMMUNITY
Start: 2023-03-08 | End: 2023-06-27

## 2023-06-27 RX ORDER — METRONIDAZOLE 7.5 MG/G
0.75 GEL VAGINAL
Qty: 1 | Refills: 1 | Status: DISCONTINUED | COMMUNITY
Start: 2023-03-13 | End: 2023-06-27

## 2023-06-27 RX ORDER — MEDROXYPROGESTERONE ACETATE 150 MG/ML
150 INJECTION, SUSPENSION INTRAMUSCULAR
Qty: 1 | Refills: 2 | Status: DISCONTINUED | COMMUNITY
Start: 2022-01-03 | End: 2023-06-27

## 2023-06-27 RX ORDER — FLUCONAZOLE 150 MG/1
150 TABLET ORAL
Qty: 2 | Refills: 1 | Status: DISCONTINUED | COMMUNITY
Start: 2023-03-13 | End: 2023-06-27

## 2023-06-27 NOTE — PLAN
[FreeTextEntry1] : - Strict precautions and aftercare instructions reviewed \par - Aware she cannot have anything in vagina or be submerged in water until seen by me \par - RTO in 2 wks for follow up and results \par - Will discuss re: whether or not Provera is necessary \par \par Nancy Santos MD \par Obstetrician/Gynecologist\par

## 2023-06-27 NOTE — PROCEDURE
[Endometrial Biopsy] : Endometrial biopsy [Time out performed] : Pre-procedure time out performed.  Patient's name, date of birth and procedure confirmed. [Consent Obtained] : Consent obtained [Risks] : risks [Benefits] : benefits [Alternatives] : alternatives [Patient] : patient [Infection] : infection [Bleeding] : bleeding [Allergic Reaction] : allergic reaction [Uterine Perforation] : uterine perforation [Pain] : pain [No Premedication] : No premedication [None] : none [Tenaculum] : Tenaculum [Easy Passage] : Easy passage [Sounded to ___ cm] : sounded to [unfilled] ~Ucm [Moderate] : moderate [Specimen Collected] : collected [Sent to Pathology] : placed in buffered formalin and sent for pathology [Tolerated Well] : Patient tolerated the procedure well [No Complications] : No complications [de-identified] : r/o endometrial abnl

## 2023-07-20 ENCOUNTER — APPOINTMENT (OUTPATIENT)
Dept: OBGYN | Facility: CLINIC | Age: 23
End: 2023-07-20
Payer: COMMERCIAL

## 2023-07-20 VITALS
WEIGHT: 292 LBS | DIASTOLIC BLOOD PRESSURE: 70 MMHG | HEIGHT: 67 IN | SYSTOLIC BLOOD PRESSURE: 136 MMHG | BODY MASS INDEX: 45.83 KG/M2

## 2023-07-20 LAB — CORE LAB BIOPSY: NORMAL

## 2023-07-20 PROCEDURE — 99212 OFFICE O/P EST SF 10 MIN: CPT

## 2023-07-20 NOTE — HISTORY OF PRESENT ILLNESS
[FreeTextEntry1] : 24yo LMP 4/27 morbidly obese F with HTN is here for follow up. She has not seen Endo yet for her elevated PRL. Her bx results are neg. She reports no issue post procedure,.

## 2023-07-20 NOTE — DISCUSSION/SUMMARY
[FreeTextEntry1] : 22yo LMP 4/27 morbidly obese F with HTN is here for follow up of results which only demonstrate breakdown and polypoid changes. \par \par - Provera challenge starting today \par - Bleeding precautions reviewed \par - Given info for Endo to see ASAP for PRL and weight loss difficulties \par RTO in 2 wks \par All questions were solicited and asnwered \par \par Nancy Santos MD \par Obstetrician/Gynecologist\par

## 2023-08-01 ENCOUNTER — APPOINTMENT (OUTPATIENT)
Dept: OBGYN | Facility: CLINIC | Age: 23
End: 2023-08-01

## 2023-09-06 ENCOUNTER — APPOINTMENT (OUTPATIENT)
Dept: OBGYN | Facility: CLINIC | Age: 23
End: 2023-09-06
Payer: COMMERCIAL

## 2023-09-06 VITALS
SYSTOLIC BLOOD PRESSURE: 137 MMHG | BODY MASS INDEX: 45.83 KG/M2 | WEIGHT: 292 LBS | DIASTOLIC BLOOD PRESSURE: 91 MMHG | HEIGHT: 67 IN

## 2023-09-06 DIAGNOSIS — Z11.3 ENCOUNTER FOR SCREENING FOR INFECTIONS WITH A PREDOMINANTLY SEXUAL MODE OF TRANSMISSION: ICD-10-CM

## 2023-09-06 DIAGNOSIS — Z23 ENCOUNTER FOR IMMUNIZATION: ICD-10-CM

## 2023-09-06 PROCEDURE — 81025 URINE PREGNANCY TEST: CPT

## 2023-09-06 PROCEDURE — 58300 INSERT INTRAUTERINE DEVICE: CPT

## 2023-09-06 RX ORDER — MEDROXYPROGESTERONE ACETATE 10 MG/1
10 TABLET ORAL DAILY
Qty: 30 | Refills: 0 | Status: ACTIVE | COMMUNITY
Start: 2023-07-20 | End: 1900-01-01

## 2023-09-06 NOTE — DISCUSSION/SUMMARY
[FreeTextEntry1] : 24yo Go LMP 8/10 with neg UCG and s/p Mirena IUD placement today.   Encouraged pt to see Endo ASAP for both elevated PRL as well as weight loss management. Both of which may help her periods   AUB:  - Provera tabs given again today for 30 days  - Mirena IUD placed today  - Strict precautions and aftercare instructions reviewed  - Aware she cannot have anything in vagina or be submerged in water for 2 wks  - RTO in 6 wks for sono and visit   Nancy Santos MD  Obstetrician/Gynecologist

## 2023-09-06 NOTE — HISTORY OF PRESENT ILLNESS
[FreeTextEntry1] : 22yo Go LMP 8/10 is here for follow up. She was given a Provera challenge at her last visit as she had not had a period since April. She says her period had already started prior to taking the Provera (which stopped her bleeding) however she missed two doses of her pill which caused her bleeding to return heavily. She denies any anemia sxs.  Pt has not seen Endo for her elevated PRL and weight loss management because she called and she didn't realized Endocrinology also did weight loss management.    Of note, pt has a new male sexual partner and desires STI screening.

## 2023-09-06 NOTE — PROCEDURE
[IUD Placement] : intrauterine device (IUD) placement [Time out performed] : Pre-procedure time out performed.  Patient's name, date of birth and procedure confirmed. [Consent Obtained] : Consent obtained [Prevention of Pregnancy] : prevention of pregnancy [Abnormal Uterine Bleeding] : abnormal uterine bleeding [Risks] : risks [Benefits] : benefits [Alternatives] : alternatives [Patient] : patient [Infection] : infection [Bleeding] : bleeding [Pain] : pain [Expulsion] : expulsion [Failure] : failure [Uterine Perforation] : uterine perforation [Neg Pregnancy Test] : negative pregnancy test [No Premedication] : No premedication [Betadine] : Betadine [Tenaculum] : Tenaculum [Easy Passage] : Easy passage [Sounded to ___ cm] : sounded to [unfilled] ~Ucm [Mirena IUD] : Mirena IUD [Tolerated Well] : Patient tolerated the procedure well [No Complications] : No complications [None] : None [Tylenol/Acetaminophen] : Tylenol/Acetaminophen [LMPDate] : 8/10 [de-identified] : [id number] [de-identified] : 2025/Sep

## 2023-09-08 ENCOUNTER — APPOINTMENT (OUTPATIENT)
Dept: OBGYN | Facility: CLINIC | Age: 23
End: 2023-09-08

## 2023-09-10 LAB
HBV SURFACE AB SER QL: NONREACTIVE
HBV SURFACE AG SER QL: NONREACTIVE
HCG UR QL: NEGATIVE
HCV AB SER QL: NONREACTIVE
HCV S/CO RATIO: 0.17 S/CO
HIV1+2 AB SPEC QL IA.RAPID: NONREACTIVE
QUALITY CONTROL: YES
T PALLIDUM AB SER QL IA: NEGATIVE
TSH SERPL-ACNC: 1.37 UIU/ML

## 2023-09-13 ENCOUNTER — NON-APPOINTMENT (OUTPATIENT)
Age: 23
End: 2023-09-13

## 2023-09-15 ENCOUNTER — NON-APPOINTMENT (OUTPATIENT)
Age: 23
End: 2023-09-15

## 2023-10-16 ENCOUNTER — APPOINTMENT (OUTPATIENT)
Dept: OBGYN | Facility: CLINIC | Age: 23
End: 2023-10-16

## 2023-10-26 LAB
BASOPHILS # BLD AUTO: 0.04 K/UL
BASOPHILS NFR BLD AUTO: 0.4 %
EOSINOPHIL # BLD AUTO: 0.13 K/UL
EOSINOPHIL NFR BLD AUTO: 1.4 %
HCT VFR BLD CALC: 37.9 %
HGB BLD-MCNC: 11.8 G/DL
IMM GRANULOCYTES NFR BLD AUTO: 0.3 %
LYMPHOCYTES # BLD AUTO: 2.59 K/UL
LYMPHOCYTES NFR BLD AUTO: 28.2 %
MAN DIFF?: NORMAL
MCHC RBC-ENTMCNC: 25.9 PG
MCHC RBC-ENTMCNC: 31.1 GM/DL
MCV RBC AUTO: 83.1 FL
MONOCYTES # BLD AUTO: 0.54 K/UL
MONOCYTES NFR BLD AUTO: 5.9 %
NEUTROPHILS # BLD AUTO: 5.84 K/UL
NEUTROPHILS NFR BLD AUTO: 63.8 %
PLATELET # BLD AUTO: 344 K/UL
RBC # BLD: 4.56 M/UL
RBC # FLD: 17.6 %
WBC # FLD AUTO: 9.17 K/UL

## 2023-10-31 ENCOUNTER — APPOINTMENT (OUTPATIENT)
Dept: OBGYN | Facility: CLINIC | Age: 23
End: 2023-10-31
Payer: COMMERCIAL

## 2023-10-31 ENCOUNTER — APPOINTMENT (OUTPATIENT)
Dept: ANTEPARTUM | Facility: CLINIC | Age: 23
End: 2023-10-31
Payer: COMMERCIAL

## 2023-10-31 ENCOUNTER — ASOB RESULT (OUTPATIENT)
Age: 23
End: 2023-10-31

## 2023-10-31 VITALS
BODY MASS INDEX: 45.83 KG/M2 | WEIGHT: 292 LBS | SYSTOLIC BLOOD PRESSURE: 134 MMHG | HEIGHT: 67 IN | DIASTOLIC BLOOD PRESSURE: 87 MMHG

## 2023-10-31 DIAGNOSIS — R79.89 OTHER SPECIFIED ABNORMAL FINDINGS OF BLOOD CHEMISTRY: ICD-10-CM

## 2023-10-31 PROCEDURE — 81025 URINE PREGNANCY TEST: CPT

## 2023-10-31 PROCEDURE — 99212 OFFICE O/P EST SF 10 MIN: CPT

## 2023-10-31 PROCEDURE — 76857 US EXAM PELVIC LIMITED: CPT | Mod: 59

## 2023-10-31 PROCEDURE — 76830 TRANSVAGINAL US NON-OB: CPT

## 2024-01-11 ENCOUNTER — APPOINTMENT (OUTPATIENT)
Dept: OBGYN | Facility: CLINIC | Age: 24
End: 2024-01-11
Payer: COMMERCIAL

## 2024-01-11 VITALS
HEIGHT: 67 IN | SYSTOLIC BLOOD PRESSURE: 162 MMHG | BODY MASS INDEX: 45.83 KG/M2 | WEIGHT: 292 LBS | DIASTOLIC BLOOD PRESSURE: 100 MMHG

## 2024-01-11 DIAGNOSIS — R23.8 OTHER SKIN CHANGES: ICD-10-CM

## 2024-01-11 PROCEDURE — 99213 OFFICE O/P EST LOW 20 MIN: CPT

## 2024-01-11 RX ORDER — VALACYCLOVIR 1 G/1
1 TABLET, FILM COATED ORAL
Qty: 14 | Refills: 3 | Status: ACTIVE | COMMUNITY
Start: 2024-01-11 | End: 1900-01-01

## 2024-01-11 NOTE — HISTORY OF PRESENT ILLNESS
[FreeTextEntry1] : 22 yo in with complaints of lesion to lip and discomfort reportedly had labs done and told hsv?  Patient requests repeat lab for confirmation  Denies fever/chills, nausea/emesis, dyspnea, or dizziness  Denies pelvic pain or vulvar/vaginal pain or lesions

## 2024-01-11 NOTE — PLAN
[FreeTextEntry1] : hsv 1/2 IgG lab Rx valtrex  avoid intimate contact until lesion clears  follow up gyn prn  notify patient of lab results

## 2024-01-16 LAB
HSV 1 IGG TYPE-SPECIFIC AB: 6.26 INDEX
HSV 2 IGG TYPE-SPECIFIC AB: 1.02 INDEX

## 2024-01-30 ENCOUNTER — APPOINTMENT (OUTPATIENT)
Dept: OBGYN | Facility: CLINIC | Age: 24
End: 2024-01-30

## 2024-02-21 ENCOUNTER — LABORATORY RESULT (OUTPATIENT)
Age: 24
End: 2024-02-21

## 2024-02-21 ENCOUNTER — APPOINTMENT (OUTPATIENT)
Dept: OBGYN | Facility: CLINIC | Age: 24
End: 2024-02-21
Payer: COMMERCIAL

## 2024-02-21 VITALS — WEIGHT: 293 LBS | BODY MASS INDEX: 45.99 KG/M2 | HEIGHT: 67 IN

## 2024-02-21 DIAGNOSIS — Z30.09 ENCOUNTER FOR OTHER GENERAL COUNSELING AND ADVICE ON CONTRACEPTION: ICD-10-CM

## 2024-02-21 DIAGNOSIS — Z87.42 PERSONAL HISTORY OF OTHER DISEASES OF THE FEMALE GENITAL TRACT: ICD-10-CM

## 2024-02-21 DIAGNOSIS — I10 ESSENTIAL (PRIMARY) HYPERTENSION: ICD-10-CM

## 2024-02-21 DIAGNOSIS — E11.9 TYPE 2 DIABETES MELLITUS W/OUT COMPLICATIONS: ICD-10-CM

## 2024-02-21 DIAGNOSIS — N92.0 EXCESSIVE AND FREQUENT MENSTRUATION WITH REGULAR CYCLE: ICD-10-CM

## 2024-02-21 LAB
BILIRUB UR QL STRIP: NORMAL
GLUCOSE UR-MCNC: NORMAL
HCG UR QL: 0.2 EU/DL
HGB UR QL STRIP.AUTO: ABNORMAL
KETONES UR-MCNC: NORMAL
LEUKOCYTE ESTERASE UR QL STRIP: NORMAL
NITRITE UR QL STRIP: NORMAL
PH UR STRIP: 6
PROT UR STRIP-MCNC: NORMAL
SP GR UR STRIP: 1.03

## 2024-02-21 PROCEDURE — 99395 PREV VISIT EST AGE 18-39: CPT | Mod: 25

## 2024-02-21 PROCEDURE — 58301 REMOVE INTRAUTERINE DEVICE: CPT

## 2024-02-21 RX ORDER — SEMAGLUTIDE 1.34 MG/ML
2 INJECTION, SOLUTION SUBCUTANEOUS
Refills: 0 | Status: ACTIVE | COMMUNITY

## 2024-02-21 NOTE — HISTORY OF PRESENT ILLNESS
[Y] : Patient is sexually active [N] : Patient denies prior pregnancies [Menarche Age: ____] : age at menarche was [unfilled] [Currently Active] : currently active

## 2024-02-23 ENCOUNTER — NON-APPOINTMENT (OUTPATIENT)
Age: 24
End: 2024-02-23

## 2024-02-23 LAB
ALBUMIN SERPL ELPH-MCNC: 4.4 G/DL
ALP BLD-CCNC: 65 U/L
ALT SERPL-CCNC: 14 U/L
ANION GAP SERPL CALC-SCNC: 14 MMOL/L
AST SERPL-CCNC: 16 U/L
BILIRUB SERPL-MCNC: 0.3 MG/DL
BUN SERPL-MCNC: 12 MG/DL
CALCIUM SERPL-MCNC: 9.1 MG/DL
CHLORIDE SERPL-SCNC: 98 MMOL/L
CO2 SERPL-SCNC: 25 MMOL/L
CREAT SERPL-MCNC: 0.83 MG/DL
EGFR: 100 ML/MIN/1.73M2
ESTIMATED AVERAGE GLUCOSE: 134 MG/DL
ESTRADIOL SERPL-MCNC: 70 PG/ML
FSH SERPL-MCNC: 5.4 IU/L
GLUCOSE SERPL-MCNC: 62 MG/DL
HBA1C MFR BLD HPLC: 6.3 %
LH SERPL-ACNC: 8.2 IU/L
POTASSIUM SERPL-SCNC: 5 MMOL/L
PROLACTIN SERPL-MCNC: 24.8 NG/ML
PROT SERPL-MCNC: 7.9 G/DL
SODIUM SERPL-SCNC: 137 MMOL/L
TSH SERPL-ACNC: 1.53 UIU/ML

## 2024-03-01 ENCOUNTER — NON-APPOINTMENT (OUTPATIENT)
Age: 24
End: 2024-03-01

## 2024-03-01 ENCOUNTER — RESULT REVIEW (OUTPATIENT)
Age: 24
End: 2024-03-01

## 2024-03-01 ENCOUNTER — APPOINTMENT (OUTPATIENT)
Dept: OBGYN | Facility: HOSPITAL | Age: 24
End: 2024-03-01

## 2024-03-02 ENCOUNTER — NON-APPOINTMENT (OUTPATIENT)
Age: 24
End: 2024-03-02

## 2024-03-12 ENCOUNTER — NON-APPOINTMENT (OUTPATIENT)
Age: 24
End: 2024-03-12

## 2024-03-13 ENCOUNTER — ASOB RESULT (OUTPATIENT)
Age: 24
End: 2024-03-13

## 2024-03-13 ENCOUNTER — APPOINTMENT (OUTPATIENT)
Dept: ANTEPARTUM | Facility: CLINIC | Age: 24
End: 2024-03-13
Payer: COMMERCIAL

## 2024-03-13 ENCOUNTER — APPOINTMENT (OUTPATIENT)
Dept: OBGYN | Facility: CLINIC | Age: 24
End: 2024-03-13
Payer: COMMERCIAL

## 2024-03-13 VITALS
DIASTOLIC BLOOD PRESSURE: 89 MMHG | BODY MASS INDEX: 45.99 KG/M2 | HEIGHT: 67 IN | WEIGHT: 293 LBS | SYSTOLIC BLOOD PRESSURE: 139 MMHG

## 2024-03-13 PROCEDURE — 76830 TRANSVAGINAL US NON-OB: CPT

## 2024-03-13 PROCEDURE — 76856 US EXAM PELVIC COMPLETE: CPT | Mod: 59

## 2024-03-13 PROCEDURE — 99213 OFFICE O/P EST LOW 20 MIN: CPT

## 2024-03-13 RX ORDER — LEVONORGESTREL 52 MG/1
INTRAUTERINE DEVICE INTRAUTERINE
Refills: 0 | Status: ACTIVE | COMMUNITY

## 2024-03-16 NOTE — REVIEW OF SYSTEMS
[Patient Intake Form Reviewed] : Patient intake form was reviewed [Negative] : Heme/Lymph [FreeTextEntry8] : uterine cramps

## 2024-03-16 NOTE — HISTORY OF PRESENT ILLNESS
[LMP unknown] : LMP unknown [IUD] : has an intrauterine device [Y] : Patient is sexually active [N] : Patient denies prior pregnancies [Menarche Age: ____] : age at menarche was [unfilled] [unknown] : Patient is unsure of the date of her LMP [Currently Active] : currently active [FreeTextEntry1] : Amy is here for an evaluation of intermittent pelvic cramps sine the IUD was placed. She has no other associated symptoms.. [TextBox_25] : br1 [BreastSonogramDate] : 5/5/23 [PapSmeardate] : 2/21/24 [TextBox_31] : neg [TextBox_53] : neg [HIVDate] : 9/6/23 [GonorrheaDate] : 10/31/23 [TextBox_63] : neg [ChlamydiaDate] : 10/31/23 [HPVDate] : 2/21/24 [TextBox_68] : neg [TextBox_78] : neg [HepatitisBDate] : 9/6/23 [TextBox_83] : neg [PGHxTotal] : 0 [HepatitisCDate] : 9/6/23 [de-identified] : mirena 3/1/24 [TextBox_88] : neg

## 2024-03-16 NOTE — PHYSICAL EXAM
[Alert] : alert [Appropriately responsive] : appropriately responsive [FreeTextEntry1] : Ar\toni BROTHERS [No Acute Distress] : no acute distress [Soft] : soft [Non-tender] : non-tender [Non-distended] : non-distended [No Mass] : no mass [Labia Majora] : normal [Labia Minora] : normal [IUD String] : an IUD string was noted [Uterine Adnexae] : normal [Normal] : normal

## 2024-03-16 NOTE — PLAN
[FreeTextEntry1] : the sonogram was reviewed, and the IUD is in proper position and the endometrial stripe is 8 mm. She has polycystic appearing ovaries, no free fluid.  we discussed that it sometimes takes a while to adjust to the IUD.  I reviewed the path report from the initial surgery, and it was all benign. She will medicate with Ibuprofen 600 mg PO q 6 hours with food for a few days.  All questions were answered. She will RTO in 2 months for another sonogram.

## 2024-03-20 ENCOUNTER — APPOINTMENT (OUTPATIENT)
Dept: OBGYN | Facility: CLINIC | Age: 24
End: 2024-03-20

## 2024-08-05 ENCOUNTER — APPOINTMENT (OUTPATIENT)
Dept: OBGYN | Facility: CLINIC | Age: 24
End: 2024-08-05

## 2024-08-05 PROCEDURE — 36415 COLL VENOUS BLD VENIPUNCTURE: CPT

## 2024-08-05 PROCEDURE — 99459 PELVIC EXAMINATION: CPT

## 2024-08-05 PROCEDURE — 99215 OFFICE O/P EST HI 40 MIN: CPT

## 2024-08-05 NOTE — PHYSICAL EXAM
[Chaperone Present] : A chaperone was present in the examining room during all aspects of the physical examination [[id number]] : A chaperone was present during the pelvic exam. [FreeTextEntry2] : LEEROY LANDRY MA [Appropriately responsive] : appropriately responsive [Alert] : alert [No Acute Distress] : no acute distress [No Lymphadenopathy] : no lymphadenopathy [Regular Rate Rhythm] : regular rate rhythm [Clear to Auscultation B/L] : clear to auscultation bilaterally [Soft] : soft [Non-tender] : non-tender [Non-distended] : non-distended [No Mass] : no mass [Oriented x3] : oriented x3 [Examination Of The Breasts] : a normal appearance [No Masses] : no breast masses were palpable [Labia Majora] : normal [Labia Minora] : normal [Discharge] : discharge [Moderate] : moderate [Foul Smelling] : not foul smelling [Clear] : clear [Watery] : watery [IUD String] : an IUD string was noted [Normal] : normal [Uterine Adnexae] : normal [FreeTextEntry5] : midcycle mucus

## 2024-08-05 NOTE — HISTORY OF PRESENT ILLNESS
[Patient reported PAP Smear was normal] : Patient reported PAP Smear was normal [IUD] : has an intrauterine device [Y] : Patient is sexually active [N] : Patient denies prior pregnancies [Menarche Age: ____] : age at menarche was [unfilled] [No] : Patient does not have concerns regarding sex [Currently Active] : currently active [BreastSonogramDate] : 05/2023 [TextBox_25] : br2 [PapSmeardate] : 02/21/2024 [HIVDate] : 09/06/2023 [TextBox_53] : Neg [GonorrheaDate] : 10/31/2022 [TextBox_63] : Neg [ChlamydiaDate] : 10/31/2022 [TextBox_68] : Neg [HPVDate] : 02/21/2024 [TextBox_78] : Neg [HepatitisBDate] : 09/06/2023 [TextBox_83] : Neg [HepatitisCDate] : 09/06/2023 [TextBox_88] : Neg [LMPDate] : 07/2024 [de-identified] : mirena [PGHxTotal] : 0 [FreeTextEntry1] : 07/2024

## 2024-08-05 NOTE — DISCUSSION/SUMMARY
[FreeTextEntry1] : -we discussed her exam and the current cervical mucus which appears copious and clear. The IUD strings appear normal -I discussed the role of her weight and adiposity in the production of extra estrogens via aromatase.  -we spent some time discussing the need for weight reduction and we visited some options. She will consider a consult at a formal weight loss program. -she still wants to pursue a hysterectomy even though we have had an excellent response to the D&C and placement of the Mirena IUD. She is sure in her mind that she does not want children, and I offered up some scenarios where she might find herself in different situations in the future and the permanency of a hysterectomy would preclude a change in her attitude toward options for future pregnancy. I indicated that I respect her autonomy to make the decision, but we are not sure if her insurance company would approve the removal for only this issue.  -complete STD and HIV counseling was performed and all questions were answered.   During this visit 40 minutes were spent face-to-face with greater than 50% of the time dedicated to counseling.

## 2024-08-05 NOTE — HISTORY OF PRESENT ILLNESS
[Patient reported PAP Smear was normal] : Patient reported PAP Smear was normal [IUD] : has an intrauterine device [Y] : Patient is sexually active [N] : Patient denies prior pregnancies [Menarche Age: ____] : age at menarche was [unfilled] [No] : Patient does not have concerns regarding sex [Currently Active] : currently active [BreastSonogramDate] : 05/2023 [TextBox_25] : br2 [PapSmeardate] : 02/21/2024 [HIVDate] : 09/06/2023 [TextBox_53] : Neg [GonorrheaDate] : 10/31/2022 [TextBox_63] : Neg [ChlamydiaDate] : 10/31/2022 [TextBox_68] : Neg [HPVDate] : 02/21/2024 [TextBox_78] : Neg [HepatitisBDate] : 09/06/2023 [TextBox_83] : Neg [HepatitisCDate] : 09/06/2023 [TextBox_88] : Neg [LMPDate] : 07/2024 [de-identified] : mirena [PGHxTotal] : 0 [FreeTextEntry1] : 07/2024

## 2024-08-05 NOTE — PHYSICAL EXAM
[Chaperone Present] : A chaperone was present in the examining room during all aspects of the physical examination [14137] : A chaperone was present during the pelvic exam. [FreeTextEntry2] : LEEROY LANDRY MA [Appropriately responsive] : appropriately responsive [Alert] : alert [No Acute Distress] : no acute distress [No Lymphadenopathy] : no lymphadenopathy [Regular Rate Rhythm] : regular rate rhythm [Clear to Auscultation B/L] : clear to auscultation bilaterally [Soft] : soft [Non-tender] : non-tender [Non-distended] : non-distended [No Mass] : no mass [Oriented x3] : oriented x3 [Examination Of The Breasts] : a normal appearance [No Masses] : no breast masses were palpable [Labia Majora] : normal [Labia Minora] : normal [Discharge] : discharge [Moderate] : moderate [Foul Smelling] : not foul smelling [Clear] : clear [Watery] : watery [IUD String] : an IUD string was noted [Normal] : normal [Uterine Adnexae] : normal [FreeTextEntry5] : midcycle mucus

## 2024-08-05 NOTE — REVIEW OF SYSTEMS
[Patient Intake Form Reviewed] : Patient intake form was reviewed [Urgency] : urgency [Dysuria] : dysuria [Negative] : Heme/Lymph [FreeTextEntry8] : vaginal discharge, clear

## 2024-08-09 ENCOUNTER — TRANSCRIPTION ENCOUNTER (OUTPATIENT)
Age: 24
End: 2024-08-09

## 2024-08-09 PROBLEM — N76.0 BACTERIAL VAGINOSIS: Status: ACTIVE | Noted: 2023-03-13

## 2024-08-09 PROBLEM — N76.0 BV (BACTERIAL VAGINOSIS): Status: ACTIVE | Noted: 2024-08-09 | Resolved: 2024-09-08

## 2024-08-12 DIAGNOSIS — A49.3 MYCOPLASMA INFECTION, UNSPECIFIED SITE: ICD-10-CM

## 2024-08-12 RX ORDER — AZITHROMYCIN 500 MG/1
500 TABLET, FILM COATED ORAL
Qty: 7 | Refills: 0 | Status: ACTIVE | COMMUNITY
Start: 2024-08-12 | End: 1900-01-01

## 2024-11-27 ENCOUNTER — APPOINTMENT (OUTPATIENT)
Dept: OBGYN | Facility: CLINIC | Age: 24
End: 2024-11-27
Payer: COMMERCIAL

## 2024-11-27 VITALS
SYSTOLIC BLOOD PRESSURE: 145 MMHG | BODY MASS INDEX: 41.59 KG/M2 | DIASTOLIC BLOOD PRESSURE: 94 MMHG | WEIGHT: 265 LBS | HEIGHT: 67 IN

## 2024-11-27 PROCEDURE — 99215 OFFICE O/P EST HI 40 MIN: CPT | Mod: 25

## 2024-11-27 PROCEDURE — 99459 PELVIC EXAMINATION: CPT

## 2024-11-28 LAB
HAV IGM SER QL: NONREACTIVE
HBV CORE IGM SER QL: NONREACTIVE
HBV SURFACE AG SER QL: NONREACTIVE
HCV AB SER QL: NONREACTIVE
HCV S/CO RATIO: 0.18 S/CO
HIV1+2 AB SPEC QL IA.RAPID: NONREACTIVE
HSV+VZV DNA SPEC QL NAA+PROBE: NOT DETECTED
SPECIMEN SOURCE: NORMAL
T PALLIDUM AB SER QL IA: NEGATIVE

## 2024-12-01 LAB
A VAGINAE DNA VAG QL NAA+PROBE: ABNORMAL
BVAB2 DNA VAG QL NAA+PROBE: ABNORMAL
C KRUSEI DNA VAG QL NAA+PROBE: NEGATIVE
C TRACH RRNA SPEC QL NAA+PROBE: NEGATIVE
CANDIDA DNA VAG QL NAA+PROBE: NEGATIVE
MEGA1 DNA VAG QL NAA+PROBE: ABNORMAL
N GONORRHOEA RRNA SPEC QL NAA+PROBE: NEGATIVE
T VAGINALIS RRNA SPEC QL NAA+PROBE: NEGATIVE

## 2024-12-04 LAB
MYCOPLASMA HOMINIS CULTURE: POSITIVE
UREAPLASMA CULTURE: NEGATIVE

## 2024-12-05 RX ORDER — DOXYCYCLINE HYCLATE 100 MG/1
100 CAPSULE ORAL TWICE DAILY
Qty: 20 | Refills: 0 | Status: ACTIVE | COMMUNITY
Start: 2024-12-05 | End: 1900-01-01

## 2025-02-04 ENCOUNTER — APPOINTMENT (OUTPATIENT)
Dept: OBGYN | Facility: CLINIC | Age: 25
End: 2025-02-04
Payer: COMMERCIAL

## 2025-02-04 VITALS
WEIGHT: 264 LBS | HEIGHT: 67 IN | DIASTOLIC BLOOD PRESSURE: 89 MMHG | SYSTOLIC BLOOD PRESSURE: 126 MMHG | BODY MASS INDEX: 41.44 KG/M2

## 2025-02-04 DIAGNOSIS — Z11.3 ENCOUNTER FOR SCREENING FOR INFECTIONS WITH A PREDOMINANTLY SEXUAL MODE OF TRANSMISSION: ICD-10-CM

## 2025-02-04 DIAGNOSIS — Z87.42 PERSONAL HISTORY OF OTHER DISEASES OF THE FEMALE GENITAL TRACT: ICD-10-CM

## 2025-02-04 DIAGNOSIS — Z97.5 PRESENCE OF (INTRAUTERINE) CONTRACEPTIVE DEVICE: ICD-10-CM

## 2025-02-04 DIAGNOSIS — E11.9 TYPE 2 DIABETES MELLITUS W/OUT COMPLICATIONS: ICD-10-CM

## 2025-02-04 DIAGNOSIS — I10 ESSENTIAL (PRIMARY) HYPERTENSION: ICD-10-CM

## 2025-02-04 DIAGNOSIS — Z71.85 ENCOUNTER FOR IMMUNIZATION SAFETY COUNSELING: ICD-10-CM

## 2025-02-04 DIAGNOSIS — Z22.39 CARRIER OF OTHER SPECIFIED BACTERIAL DISEASES: ICD-10-CM

## 2025-02-04 PROCEDURE — 99459 PELVIC EXAMINATION: CPT

## 2025-02-04 PROCEDURE — 99214 OFFICE O/P EST MOD 30 MIN: CPT

## 2025-02-06 LAB
HSV 1+2 IGG SER IA-IMP: NEGATIVE
HSV 1+2 IGG SER IA-IMP: POSITIVE
HSV1 IGG SER QL: 4.77 INDEX
HSV2 IGG SER QL: 0.66 INDEX
T PALLIDUM AB SER QL IA: NEGATIVE

## 2025-02-07 LAB
HAV IGM SER QL: NONREACTIVE
HBV CORE IGM SER QL: NONREACTIVE
HBV SURFACE AG SER QL: NONREACTIVE
HCV AB SER QL: NONREACTIVE
HCV S/CO RATIO: 0.16 S/CO
HIV1+2 AB SPEC QL IA.RAPID: NONREACTIVE

## 2025-02-12 LAB
MYCOPLASMA HOMINIS CULTURE: NEGATIVE
UREAPLASMA CULTURE: POSITIVE

## 2025-03-11 DIAGNOSIS — N94.6 DYSMENORRHEA, UNSPECIFIED: ICD-10-CM

## 2025-03-11 RX ORDER — DOXYCYCLINE HYCLATE 100 MG/1
100 CAPSULE ORAL
Qty: 14 | Refills: 0 | Status: ACTIVE | COMMUNITY
Start: 2025-03-11 | End: 1900-01-01

## 2025-03-11 RX ORDER — IBUPROFEN 600 MG/1
600 TABLET, FILM COATED ORAL 4 TIMES DAILY
Qty: 60 | Refills: 2 | Status: ACTIVE | COMMUNITY
Start: 2025-03-11 | End: 1900-01-01

## 2025-04-30 ENCOUNTER — APPOINTMENT (OUTPATIENT)
Dept: OBGYN | Facility: CLINIC | Age: 25
End: 2025-04-30